# Patient Record
Sex: FEMALE | Race: WHITE | URBAN - METROPOLITAN AREA
[De-identification: names, ages, dates, MRNs, and addresses within clinical notes are randomized per-mention and may not be internally consistent; named-entity substitution may affect disease eponyms.]

---

## 2022-09-21 ENCOUNTER — INITIAL CONSULT (OUTPATIENT)
Dept: ONCOLOGY | Age: 27
End: 2022-09-21
Payer: COMMERCIAL

## 2022-09-21 ENCOUNTER — HOSPITAL ENCOUNTER (OUTPATIENT)
Dept: INFUSION THERAPY | Age: 27
Discharge: HOME | End: 2022-09-21
Payer: COMMERCIAL

## 2022-09-21 VITALS
BODY MASS INDEX: 31.07 KG/M2 | SYSTOLIC BLOOD PRESSURE: 152 MMHG | DIASTOLIC BLOOD PRESSURE: 93 MMHG | HEIGHT: 68 IN | OXYGEN SATURATION: 99 % | TEMPERATURE: 97.9 F | HEART RATE: 88 BPM | WEIGHT: 205 LBS

## 2022-09-21 DIAGNOSIS — D72.828 OTHER ELEVATED WHITE BLOOD CELL (WBC) COUNT: ICD-10-CM

## 2022-09-21 DIAGNOSIS — R59.0 LYMPHADENOPATHY OF HEAD AND NECK REGION: ICD-10-CM

## 2022-09-21 PROBLEM — D72.829 LEUKOCYTOSIS (LEUCOCYTOSIS): Status: ACTIVE | Noted: 2022-09-21

## 2022-09-21 PROCEDURE — 99211 OFF/OP EST MAY X REQ PHY/QHP: CPT

## 2022-09-21 PROCEDURE — 99204 OFFICE O/P NEW MOD 45 MIN: CPT | Performed by: INTERNAL MEDICINE

## 2022-09-21 RX ORDER — BUPROPION HYDROCHLORIDE 150 MG/1
TABLET ORAL
COMMUNITY
Start: 2022-07-19

## 2022-09-21 RX ORDER — ALBUTEROL SULFATE 90 UG/1
2 AEROSOL, METERED RESPIRATORY (INHALATION)
COMMUNITY
Start: 2022-03-07

## 2022-09-21 RX ORDER — LEVOTHYROXINE SODIUM 88 UG/1
TABLET ORAL
COMMUNITY
Start: 2022-07-15

## 2022-09-21 RX ORDER — PHENTERMINE HYDROCHLORIDE 37.5 MG/1
TABLET ORAL
COMMUNITY
Start: 2022-07-21

## 2022-09-21 ASSESSMENT — PATIENT HEALTH QUESTIONNAIRE - PHQ9
SUM OF ALL RESPONSES TO PHQ QUESTIONS 1-9: 0
SUM OF ALL RESPONSES TO PHQ9 QUESTIONS 1 & 2: 0
1. LITTLE INTEREST OR PLEASURE IN DOING THINGS: 0
SUM OF ALL RESPONSES TO PHQ QUESTIONS 1-9: 0
2. FEELING DOWN, DEPRESSED OR HOPELESS: 0
SUM OF ALL RESPONSES TO PHQ QUESTIONS 1-9: 0
SUM OF ALL RESPONSES TO PHQ QUESTIONS 1-9: 0

## 2022-09-21 NOTE — PROGRESS NOTES
MA Rooming Questions  Patient: Guanako Obrien  MRN: [id number]    Date: 9/21/2022      NEW PATIENT     5. Did the patient have a depression screening completed today?  Yes    PHQ-9 Total Score: 0 (9/21/2022  3:38 PM)       PHQ-9 Given to (if applicable):               PHQ-9 Score (if applicable):                     [] Positive     []  Negative              Does question #9 need addressed (if applicable)                     [] Yes    []  No               Leodan Castaneda CMA

## 2022-09-21 NOTE — PROGRESS NOTES
Patient Name:  Devin Obrien  Patient :  1995  Patient MRN:  [id number]     Primary Oncologist: Johnathan Pandey MD  Referring Provider: RONNI Hess CNP     Date of Service: 2022      Reason for Consult: To evaluate the patient with lymphadenopathy. Chief Complaint:    Chief Complaint   Patient presents with    New Patient     Patient's active problem list:      Lymphadenopathy    HPI:   Devin Obrien is a 68-year-old very pleasant female with medical history significant for hyperlipidemia, bronchial asthma, attention deficit disorder, depression/anxiety, hypothyroidism and irritable bowel syndrome, referred to me on 2022 for evaluation of cervical lymphadenopathy. She stated that she has been having recurrent illnesses that began in 2021 (\"bad lung infection\"). Then 6 weeks later had Influenza A. In , she went to [place] and upon return had a fungal infection on her leg, itching all over. Given tx for fungal infection and this resolved. Then 1 month later had poison ivy, then began itching all over again. Given steroid injection and steroid taper, then would have lymph nodes swell up and then decrease in size. Right leg, then right cervical and now the left side X 3 months. Subsequently had US neck/thyroid on 22 and it showed no thyroid nodule identified. Thyroid gland was hypervascular. A few scattered nonenlarged lymph nodes without aggressive features account for the palpable abnormalities. She was also noted to have mild leukocytosis (WBC 11.8) on 22. Her WBC count was normal on 22. No anemia or thrombocytopenia. She was subsequently referred to me for evaluation of her swollen lymph nodes and leukocytosis. She has back pain off and on. Besides that, she doesn't have any significant symptoms on today visit. Past Medical History:     Significant for  1. Hyperlipidemia  2. Bronchial asthma  3. Attention deficit disorder  4. Depression/anxiety  5. Hypothyroidism  6. Irritable bowel syndrome with constipation  7. Seasonal allergies    Past Surgery History:    Significant for baseline   1. Appendicectomy  2. [place] teeth extraction    Social History:   She denies smoking or illicit drug abuse. She socially drinks alcohol. Family History:    Significant for esophageal cancer in her maternal grandfather and the maternal great uncle. Her maternal great grandfather had stomach cancer and maternal great grandmother has kidney and colon cancer. Her maternal grandmother has uterine cancer and maternal great aunt has breast cancer. Allergies   Allergen Reactions    Amoxicillin Rash     Other reaction(s): Rash  Other reaction(s): Rash      Clavulanic Acid Rash     Other reaction(s): Rash  Other reaction(s): Rash      Penicillins Rash       Current Outpatient Medications on File Prior to Visit   Medication Sig Dispense Refill    albuterol sulfate HFA (PROVENTIL;VENTOLIN;PROAIR) 108 (90 Base) MCG/ACT inhaler Inhale 2 puffs into the lungs      buPROPion (WELLBUTRIN XL) 150 MG extended release tablet TAKE 1 TABLET BY MOUTH EVERY DAY      levothyroxine (SYNTHROID) 88 MCG tablet TAKE 1 TABLET BY MOUTH EVERY DAY      phentermine (ADIPEX-P) 37.5 MG tablet TAKE 1 TABLET (37.5 MG TOTAL) BY MOUTH EVERY MORNING (BEFORE BREAKFAST). No current facility-administered medications on file prior to visit. Review of Systems:  Constitutional:  No weight loss, No fever, No chills, No night sweats. Energy level good. Eyes:  No diplopia, No transient or permanent loss of vision, No scotomata. ENT / Mouth:  No epistaxis, No dysphagia, No hoarseness, No oral ulcers, No gingival bleeding. No sore throat, No postnasal drip, No nasal drip, No mouth pain, No sinus pain, No tinnitus, Normal hearing. Cardiovascular:  No chest pain, No palpitations, No syncope, No upper extremity edema, No lower extremity edema, No calf discomfort.   Respiratory: No cough. No hemoptysis, No pleurisy, No wheezing, No dyspnea. Breast:  No breast mass, No pain, No nipple discharge, No change in size, No change in shape. Gastrointestinal:  No abdominal pain, No abdominal cramping, No nausea, No vomiting, No constipation, No diarrhea, No hematochezia, No melena, No jaundice, No dyspepsia, No dysphagia. Urinary:  No dysuria, No hematuria, No urinary incontinence. Gynecological:  No vaginal discharge, No suprapubic pain, No abnormal vaginal bleeding. Musculoskeletal:  No muscle pain, No swollen joints, No joint redness, No bone pain, No spine tenderness, has lower back pain. Skin:  No rash, No nodules, No pruritus, No lesions. Neurologic:  No confusion, No seizures, No syncope, No tremor, No speech change, No headache, No hiccups, No abnormal gait, No sensory changes, No weakness. Psychiatric:  No depression, No anxiety, Concentration normal.  Endocrine:  No polyuria, No polydipsia, No hot flashes, No thyroid symptoms. Hematologic:  No epistaxis, No gingival bleeding, No petechiae, No ecchymosis. Lymphatic:  No lymphadenopathy, No lymphedema. Allergy / Immunologic:  No eczema, No frequent mucous infections, No frequent respiratory infections, No recurrent urticarial, No frequent skin infections.      Vital Signs: BP (!) 152/93 (Site: Right Upper Arm, Position: Sitting, Cuff Size: Large Adult)   Pulse 88   Temp 97.9 °F (36.6 °C) (Temporal)   Ht 5' 7.75\" (1.721 m)   Wt 205 lb (93 kg)   SpO2 99%   BMI 31.40 kg/m²      Physical Exam:  CONSTITUTIONAL: awake, alert, cooperative, no apparent distress   EYES: pupils equal, round and reactive to light, sclera clear, normal conjunctiva  ENT: Normocephalic, without obvious abnormality, atraumatic  NECK: supple, symmetrical, no jugular venous distension, no carotid bruits   HEMATOLOGIC/LYMPHATIC: no cervical, supraclavicular or axillary lymphadenopathy   LUNGS: VBS, no wheezes, no increased work of breathing, no rhonchi, clear to auscultation, no crackles,    CARDIOVASCULAR: regular rate and rhythm, normal S1 and S2, no murmur noted  ABDOMEN: normal bowel sounds x 4, soft, non-distended, non-tender, no masses palpated, no hepatosplenomegaly   MUSCULOSKELETAL: full range of motion noted, tone is normal  NEUROLOGIC: awake, alert, oriented to name, place and time. Motor skills grossly intact. SKIN: appears intact, normal skin color, normal texture, normal turgor, no jaundice. EXTREMITIES: no LE edema, no leg swelling, no cyanosis, no clubbing,       Labs:  Hematology:  No results found for: WBC, RBC, HGB, HCT, MCV, MCH, MCHC, RDW, PLT, MPV, BANDSPCT, SEGSPCT, EOSRELPCT, BASOPCT, LYMPHOPCT, MONOPCT, BANDABS, SEGSABS, EOSABS, BASOSABS, LYMPHSABS, MONOSABS, DIFFTYPE, ANISOCYTOSIS, POLYCHROM, WBCMORP, PLTM  No results found for: ESR  Chemistry:  No results found for: NA, K, CL, CO2, BUN, CREATININE, GLUCOSE, CALCIUM, PROT, LABALBU, BILITOT, ALKPHOS, AST, ALT, LABGLOM, GFRAA, AGRATIO, GLOB, PHOS, MG, POCCA, POCGLU  No results found for: MMA, LDH, HOMOCYSTEINE  No components found for: LD  No results found for: TSHHS, T4FREE, FT3  Immunology:  No results found for: PROT, SPEP, ALBUMINELP, LABALPH, LABBETA, GAMGLOB  No results found for: Alejandra Flies, LAMBDAUVOL, KLFLCR  No results found for: B2M  Coagulation Panel:  No results found for: PROTIME, INR, APTT, DDIMER  Anemia Panel:  No results found for: QGXSKFDO01, FOLATE  Tumor Markers:  No results found for: , CEA, , LABCA2, PSA     Observations:  PHQ-9 Total Score: 0 (9/21/2022  3:38 PM)       Assessment   Cervical lymphadenopathy  Leukocytosis    Plan:  I reviewed her US thyroid/neck with her and her mother. There is no palpable lymph node detected on today physical exam. Since there is no palpable node on today exam, I believe her previously palpable cervical lymph adenopathy are most likely due to reactive process.      She has mild leukocytosis on 9/16/22 blood test and normal WBC count on 7/12/22. Her hemoglobin and platelet counts were normal. I also believe her mild leukocytosis is due to reactive process. I do not recommend additional work ups at this moment. I will re evaluate her again in 3 months and will repeat physical exam and labs at that time. I asked her to contact me if she has recurrent persistent lymphadenopathy in future. If that is the case, I will consider to have CT scans. I answered all her questions and concerns for today. I asked her to follow up with primary care physician on regular basis. I will continue to keep you updated on her progress. Thank you for allowing me to participate in the care of this very pleasant patient. Recent imaging and labs were reviewed and discussed with the patient.

## 2022-09-26 ENCOUNTER — OFFICE VISIT (OUTPATIENT)
Dept: OBGYN | Age: 27
End: 2022-09-26
Payer: COMMERCIAL

## 2022-09-26 ENCOUNTER — HOSPITAL ENCOUNTER (OUTPATIENT)
Age: 27
Setting detail: SPECIMEN
Discharge: HOME | End: 2022-09-26
Payer: COMMERCIAL

## 2022-09-26 VITALS
WEIGHT: 206 LBS | SYSTOLIC BLOOD PRESSURE: 144 MMHG | HEIGHT: 67 IN | BODY MASS INDEX: 32.33 KG/M2 | DIASTOLIC BLOOD PRESSURE: 93 MMHG

## 2022-09-26 DIAGNOSIS — N93.9 ABNORMAL UTERINE BLEEDING (AUB): ICD-10-CM

## 2022-09-26 DIAGNOSIS — R10.2 PELVIC PAIN: ICD-10-CM

## 2022-09-26 DIAGNOSIS — Z30.432 ENCOUNTER FOR IUD REMOVAL: ICD-10-CM

## 2022-09-26 DIAGNOSIS — Z01.419 ENCOUNTER FOR ANNUAL ROUTINE GYNECOLOGICAL EXAMINATION: Primary | ICD-10-CM

## 2022-09-26 PROCEDURE — 88142 CYTOPATH C/V THIN LAYER: CPT

## 2022-09-26 PROCEDURE — 58301 REMOVE INTRAUTERINE DEVICE: CPT | Performed by: OBSTETRICS & GYNECOLOGY

## 2022-09-26 PROCEDURE — 99395 PREV VISIT EST AGE 18-39: CPT | Performed by: OBSTETRICS & GYNECOLOGY

## 2022-09-26 RX ORDER — ACETAMINOPHEN AND CODEINE PHOSPHATE 120; 12 MG/5ML; MG/5ML
1 SOLUTION ORAL DAILY
Qty: 84 TABLET | Refills: 3 | Status: SHIPPED | OUTPATIENT
Start: 2022-09-26

## 2022-09-26 SDOH — ECONOMIC STABILITY: FOOD INSECURITY: WITHIN THE PAST 12 MONTHS, YOU WORRIED THAT YOUR FOOD WOULD RUN OUT BEFORE YOU GOT MONEY TO BUY MORE.: NEVER TRUE

## 2022-09-26 SDOH — ECONOMIC STABILITY: FOOD INSECURITY: WITHIN THE PAST 12 MONTHS, THE FOOD YOU BOUGHT JUST DIDN'T LAST AND YOU DIDN'T HAVE MONEY TO GET MORE.: NEVER TRUE

## 2022-09-26 SDOH — ECONOMIC STABILITY: HOUSING INSECURITY
IN THE LAST 12 MONTHS, WAS THERE A TIME WHEN YOU DID NOT HAVE A STEADY PLACE TO SLEEP OR SLEPT IN A SHELTER (INCLUDING NOW)?: NO

## 2022-09-26 SDOH — ECONOMIC STABILITY: INCOME INSECURITY: IN THE LAST 12 MONTHS, WAS THERE A TIME WHEN YOU WERE NOT ABLE TO PAY THE MORTGAGE OR RENT ON TIME?: NO

## 2022-09-26 SDOH — ECONOMIC STABILITY: TRANSPORTATION INSECURITY
IN THE PAST 12 MONTHS, HAS LACK OF TRANSPORTATION KEPT YOU FROM MEETINGS, WORK, OR FROM GETTING THINGS NEEDED FOR DAILY LIVING?: NO

## 2022-09-26 SDOH — ECONOMIC STABILITY: TRANSPORTATION INSECURITY
IN THE PAST 12 MONTHS, HAS THE LACK OF TRANSPORTATION KEPT YOU FROM MEDICAL APPOINTMENTS OR FROM GETTING MEDICATIONS?: NO

## 2022-09-26 ASSESSMENT — ENCOUNTER SYMPTOMS
GASTROINTESTINAL NEGATIVE: 1
RESPIRATORY NEGATIVE: 1
EYES NEGATIVE: 1
ALLERGIC/IMMUNOLOGIC NEGATIVE: 1

## 2022-09-26 ASSESSMENT — SOCIAL DETERMINANTS OF HEALTH (SDOH): HOW HARD IS IT FOR YOU TO PAY FOR THE VERY BASICS LIKE FOOD, HOUSING, MEDICAL CARE, AND HEATING?: NOT VERY HARD

## 2022-09-26 NOTE — PROGRESS NOTES
9/26/22    Samina Obrien  1995    Chief Complaint   Patient presents with    Annual Exam     Pt here for annual exam. LMP 9/23/22. Last pap 2020 @ [facility] per pt. Pt has Mira [place]. Inserted 2020. Pt c/o severe pain and pressure, with no relief. Pt would like to switch to ocp w/o estrogen. Pt is currently sexually active. The patient is a 32 y.o. female, No obstetric history on file. who presents for her annual exam.  She is menstruating abnormally. She is  sexually active. She is currently taking birth control. Birth control method is Mira [place] IUD    She reports additional symptoms of abnormal bleeding and pelvic pain. Pap smear history: Her last PAP smear was in 2020.   Her results were normal.    Past Medical History:   Diagnosis Date    Anxiety     Asthma     Depression     Hyperlipidemia     IBS (irritable bowel syndrome)     Thyroid disease        Past Surgical History:   Procedure Laterality Date    APPENDECTOMY         Family History   Problem Relation Age of Onset    Mental Illness Mother     High Blood Pressure Mother     Diabetes Mother     Depression Mother     Coronary Art Dis Mother     Migraines Mother     Migraines Father     Mental Illness Father     High Cholesterol Father     High Blood Pressure Father     Depression Father     Coronary Art Dis Father     Asthma Father     Alcohol Abuse Father     Irritable Bowel Syndrome Father     Alcohol Abuse Maternal Grandmother     Asthma Maternal Grandfather        Social History     Tobacco Use    Smoking status: Never    Smokeless tobacco: Never   Substance Use Topics    Alcohol use: Yes    Drug use: Yes     Types: Marijuana [place])       Current Outpatient Medications   Medication Sig Dispense Refill    norethindrone (LAURIE) 0.35 MG tablet Take 1 tablet by mouth daily 84 tablet 3    albuterol sulfate HFA (PROVENTIL;VENTOLIN;PROAIR) 108 (90 Base) MCG/ACT inhaler Inhale 2 puffs into the lungs      buPROPion (WELLBUTRIN XL) 150 MG extended release tablet TAKE 1 TABLET BY MOUTH EVERY DAY      levothyroxine (SYNTHROID) 88 MCG tablet TAKE 1 TABLET BY MOUTH EVERY DAY      phentermine (ADIPEX-P) 37.5 MG tablet TAKE 1 TABLET (37.5 MG TOTAL) BY MOUTH EVERY MORNING (BEFORE BREAKFAST). No current facility-administered medications for this visit. Allergies   Allergen Reactions    Amoxicillin Rash     Other reaction(s): Rash  Other reaction(s): Rash      Clavulanic Acid Rash     Other reaction(s): Rash  Other reaction(s): Rash      Penicillins Rash       No obstetric history on file. Immunization History   Administered Date(s) Administered    COVID-19, J&J, (age 18y+), IM, 0.5 mL 06/10/2021    COVID-19, MODERNA BLUE border, Primary or Immunocompromised, (age 12y+), IM, 100 mcg/0.5mL 01/20/2022       Review of Systems   Constitutional: Negative. Eyes: Negative. Respiratory: Negative. Cardiovascular: Negative. Gastrointestinal: Negative. Endocrine: Negative. Genitourinary:  Positive for menstrual problem and pelvic pain. Musculoskeletal: Negative. Skin: Negative. Allergic/Immunologic: Negative. Neurological: Negative. Hematological: Negative. Psychiatric/Behavioral: Negative. BP (!) 144/93   Ht 5' 7\" (1.702 m)   Wt 206 lb (93.4 kg)   LMP 09/23/2022   BMI 32.26 kg/m²     Physical Exam  Exam conducted with a chaperone present. Constitutional:       Appearance: Normal appearance. HENT:      Head: Normocephalic and atraumatic. Nose: Nose normal.   Eyes:      Conjunctiva/sclera: Conjunctivae normal.   Cardiovascular:      Rate and Rhythm: Normal rate. Pulses: Normal pulses. Pulmonary:      Effort: Pulmonary effort is normal.   Chest:   Breasts:     Right: Normal.      Left: Normal.   Abdominal:      General: Abdomen is flat. Bowel sounds are normal.      Palpations: Abdomen is soft. Hernia: There is no hernia in the left inguinal area or right inguinal area.    Genitourinary:     General: Normal vulva. Exam position: Lithotomy position. Labia:         Right: No rash, tenderness or lesion. Left: No rash, tenderness or lesion. Urethra: No prolapse. Vagina: Normal. No foreign body. No vaginal discharge, erythema, tenderness, bleeding, lesions or prolapsed vaginal walls. Cervix: No cervical motion tenderness, discharge, friability, lesion, erythema or cervical bleeding. Uterus: Normal. Not enlarged, not tender and no uterine prolapse. Adnexa: Right adnexa normal and left adnexa normal.        Right: No mass, tenderness or fullness. Left: No mass, tenderness or fullness. Musculoskeletal:      Cervical back: Normal range of motion and neck supple. Lymphadenopathy:      Upper Body:      Right upper body: No supraclavicular, axillary or pectoral adenopathy. Left upper body: No supraclavicular, axillary or pectoral adenopathy. Skin:     General: Skin is warm. Coloration: Skin is not ashen or cyanotic. Findings: No abrasion, abscess or bruising. Rash is not crusting or macular. Neurological:      Mental Status: She is alert and oriented to person, place, and time. No results found for this visit on 09/26/22. Assessment and Plan   Diagnosis Orders   1. Encounter for annual routine gynecological examination  norethindrone (LAURIE) 0.35 MG tablet      2. Abnormal uterine bleeding (AUB)  norethindrone (LAURIE) 0.35 MG tablet      3. Pelvic pain  norethindrone (LAURIE) 0.35 MG tablet    PAP SMEAR          Return in about 1 year (around 9/26/2023). Yana Morales MD         PRE-OP DIAGNOSIS: pelvic pain, aub  POST-OP DIAGNOSIS: Same   PROCEDURE: IUD Removal   Performing Physician: Yana Morales MD  Risks: Risks were reviewed with patient in detail which include breakage of device and/or strings (which then may need to be removed by a specialist under anesthesia), bleeding, and pain.     PROCEDURE:   The speculum was placed and the IUD strings visualized. Using ringed forceps, the IUD string was grasped and the device was removed without difficulty. Bleeding was minimal.     Followup: The patient tolerated the procedure well without complications. Standard post-procedure care is explained and return precautions are given. Patient desires to use ocps for birth control.

## 2023-09-21 DIAGNOSIS — Z01.419 ENCOUNTER FOR ANNUAL ROUTINE GYNECOLOGICAL EXAMINATION: ICD-10-CM

## 2023-09-21 DIAGNOSIS — R10.2 PELVIC PAIN: ICD-10-CM

## 2023-09-21 DIAGNOSIS — N93.9 ABNORMAL UTERINE BLEEDING (AUB): ICD-10-CM

## 2023-09-21 RX ORDER — ACETAMINOPHEN AND CODEINE PHOSPHATE 120; 12 MG/5ML; MG/5ML
1 SOLUTION ORAL DAILY
Qty: 84 TABLET | Refills: 3 | OUTPATIENT
Start: 2023-09-21

## 2023-10-03 DIAGNOSIS — N93.9 ABNORMAL UTERINE BLEEDING (AUB): ICD-10-CM

## 2023-10-03 DIAGNOSIS — Z01.419 ENCOUNTER FOR ANNUAL ROUTINE GYNECOLOGICAL EXAMINATION: ICD-10-CM

## 2023-10-03 DIAGNOSIS — R10.2 PELVIC PAIN: ICD-10-CM

## 2023-10-03 RX ORDER — ACETAMINOPHEN AND CODEINE PHOSPHATE 120; 12 MG/5ML; MG/5ML
1 SOLUTION ORAL DAILY
Qty: 84 TABLET | Refills: 0 | Status: SHIPPED | OUTPATIENT
Start: 2023-10-03

## 2023-12-22 DIAGNOSIS — R10.2 PELVIC PAIN: ICD-10-CM

## 2023-12-22 DIAGNOSIS — N93.9 ABNORMAL UTERINE BLEEDING (AUB): ICD-10-CM

## 2023-12-22 DIAGNOSIS — Z01.419 ENCOUNTER FOR ANNUAL ROUTINE GYNECOLOGICAL EXAMINATION: ICD-10-CM

## 2023-12-22 RX ORDER — NORETHINDRONE 0.35 MG/1
1 TABLET ORAL DAILY
Qty: 84 TABLET | Refills: 0 | OUTPATIENT
Start: 2023-12-22

## 2023-12-27 DIAGNOSIS — Z01.419 ENCOUNTER FOR ANNUAL ROUTINE GYNECOLOGICAL EXAMINATION: ICD-10-CM

## 2023-12-27 DIAGNOSIS — R10.2 PELVIC PAIN: ICD-10-CM

## 2023-12-27 DIAGNOSIS — N93.9 ABNORMAL UTERINE BLEEDING (AUB): ICD-10-CM

## 2023-12-28 DIAGNOSIS — R10.2 PELVIC PAIN: ICD-10-CM

## 2023-12-28 DIAGNOSIS — N93.9 ABNORMAL UTERINE BLEEDING (AUB): ICD-10-CM

## 2023-12-28 DIAGNOSIS — Z01.419 ENCOUNTER FOR ANNUAL ROUTINE GYNECOLOGICAL EXAMINATION: ICD-10-CM

## 2023-12-28 RX ORDER — NORETHINDRONE 0.35 MG/1
1 TABLET ORAL DAILY
Qty: 84 TABLET | Refills: 0 | OUTPATIENT
Start: 2023-12-28

## 2023-12-29 RX ORDER — ACETAMINOPHEN AND CODEINE PHOSPHATE 120; 12 MG/5ML; MG/5ML
1 SOLUTION ORAL DAILY
Qty: 84 TABLET | Refills: 1 | Status: SHIPPED | OUTPATIENT
Start: 2023-12-29

## 2024-01-23 ENCOUNTER — OFFICE VISIT (OUTPATIENT)
Dept: OBGYN | Age: 29
End: 2024-01-23
Payer: COMMERCIAL

## 2024-01-23 VITALS
WEIGHT: 225 LBS | SYSTOLIC BLOOD PRESSURE: 125 MMHG | DIASTOLIC BLOOD PRESSURE: 76 MMHG | BODY MASS INDEX: 35.24 KG/M2 | HEART RATE: 71 BPM

## 2024-01-23 DIAGNOSIS — R10.2 PELVIC PAIN: ICD-10-CM

## 2024-01-23 DIAGNOSIS — N93.9 ABNORMAL UTERINE BLEEDING (AUB): ICD-10-CM

## 2024-01-23 DIAGNOSIS — E66.9 OBESITY, UNSPECIFIED CLASSIFICATION, UNSPECIFIED OBESITY TYPE, UNSPECIFIED WHETHER SERIOUS COMORBIDITY PRESENT: ICD-10-CM

## 2024-01-23 DIAGNOSIS — Z01.419 ENCOUNTER FOR ANNUAL ROUTINE GYNECOLOGICAL EXAMINATION: ICD-10-CM

## 2024-01-23 DIAGNOSIS — N92.6 IRREGULAR MENSES: ICD-10-CM

## 2024-01-23 DIAGNOSIS — Z01.419 WOMEN'S ANNUAL ROUTINE GYNECOLOGICAL EXAMINATION: Primary | ICD-10-CM

## 2024-01-23 PROCEDURE — 99395 PREV VISIT EST AGE 18-39: CPT | Performed by: NURSE PRACTITIONER

## 2024-01-23 PROCEDURE — G8484 FLU IMMUNIZE NO ADMIN: HCPCS | Performed by: NURSE PRACTITIONER

## 2024-01-23 RX ORDER — ACETAMINOPHEN AND CODEINE PHOSPHATE 120; 12 MG/5ML; MG/5ML
1 SOLUTION ORAL DAILY
Qty: 84 TABLET | Refills: 3 | Status: SHIPPED | OUTPATIENT
Start: 2024-01-23

## 2024-01-23 SDOH — ECONOMIC STABILITY: FOOD INSECURITY: WITHIN THE PAST 12 MONTHS, YOU WORRIED THAT YOUR FOOD WOULD RUN OUT BEFORE YOU GOT MONEY TO BUY MORE.: NEVER TRUE

## 2024-01-23 SDOH — ECONOMIC STABILITY: FOOD INSECURITY: WITHIN THE PAST 12 MONTHS, THE FOOD YOU BOUGHT JUST DIDN'T LAST AND YOU DIDN'T HAVE MONEY TO GET MORE.: NEVER TRUE

## 2024-01-23 SDOH — ECONOMIC STABILITY: INCOME INSECURITY: HOW HARD IS IT FOR YOU TO PAY FOR THE VERY BASICS LIKE FOOD, HOUSING, MEDICAL CARE, AND HEATING?: NOT VERY HARD

## 2024-01-23 ASSESSMENT — PATIENT HEALTH QUESTIONNAIRE - PHQ9
SUM OF ALL RESPONSES TO PHQ9 QUESTIONS 1 & 2: 0
SUM OF ALL RESPONSES TO PHQ QUESTIONS 1-9: 0
SUM OF ALL RESPONSES TO PHQ QUESTIONS 1-9: 0
2. FEELING DOWN, DEPRESSED OR HOPELESS: 0
SUM OF ALL RESPONSES TO PHQ QUESTIONS 1-9: 0
SUM OF ALL RESPONSES TO PHQ QUESTIONS 1-9: 0
1. LITTLE INTEREST OR PLEASURE IN DOING THINGS: 0

## 2024-01-23 ASSESSMENT — ENCOUNTER SYMPTOMS
GASTROINTESTINAL NEGATIVE: 1
DIARRHEA: 0
CONSTIPATION: 0
SHORTNESS OF BREATH: 0
VOMITING: 0
ABDOMINAL PAIN: 0
NAUSEA: 0
RESPIRATORY NEGATIVE: 1

## 2024-01-23 NOTE — PROGRESS NOTES
TAKE 1 TABLET BY MOUTH EVERY DAY      buPROPion (WELLBUTRIN XL) 150 MG extended release tablet TAKE 1 TABLET BY MOUTH EVERY DAY (Patient not taking: Reported on 2024)      phentermine (ADIPEX-P) 37.5 MG tablet TAKE 1 TABLET (37.5 MG TOTAL) BY MOUTH EVERY MORNING (BEFORE BREAKFAST). (Patient not taking: Reported on 2024)       No current facility-administered medications for this visit.       Allergies   Allergen Reactions    Amoxicillin Rash     Other reaction(s): Rash  Other reaction(s): Rash      Clavulanic Acid Rash     Other reaction(s): Rash  Other reaction(s): Rash      Penicillins Rash           Immunization History   Administered Date(s) Administered    COVID-19, J&J, (age 18y+), IM, 0.5 mL 06/10/2021    COVID-19, MODERNA BLUE border, Primary or Immunocompromised, (age 12y+), IM, 100 mcg/0.5mL 2022       Review of Systems   Constitutional: Negative.  Negative for fatigue.   Respiratory: Negative.  Negative for shortness of breath.    Gastrointestinal: Negative.  Negative for abdominal pain, constipation, diarrhea, nausea and vomiting.   Genitourinary: Negative.    Neurological:  Negative for dizziness.   Psychiatric/Behavioral: Negative.         /76 (Site: Right Upper Arm, Position: Sitting, Cuff Size: Large Adult)   Pulse 71   Wt 102.1 kg (225 lb)   LMP 2023 (Exact Date)   BMI 35.24 kg/m²     Physical Exam  Vitals and nursing note reviewed.   Constitutional:       Appearance: Normal appearance. She is obese.   HENT:      Head: Normocephalic.   Pulmonary:      Effort: Pulmonary effort is normal. No respiratory distress.   Chest:   Breasts:     Right: Normal.      Left: Normal.   Abdominal:      Palpations: Abdomen is soft.      Tenderness: There is no abdominal tenderness.   Genitourinary:     General: Normal vulva.      Exam position: Lithotomy position.      Vagina: Normal.      Cervix: Normal.      Uterus: Normal.       Adnexa: Right adnexa normal and left adnexa

## 2025-01-30 DIAGNOSIS — N92.6 IRREGULAR MENSES: ICD-10-CM

## 2025-01-30 RX ORDER — ACETAMINOPHEN AND CODEINE PHOSPHATE 120; 12 MG/5ML; MG/5ML
1 SOLUTION ORAL DAILY
Qty: 28 TABLET | Refills: 0 | Status: SHIPPED | OUTPATIENT
Start: 2025-01-30

## 2025-02-19 ENCOUNTER — HOSPITAL ENCOUNTER (OUTPATIENT)
Age: 30
Setting detail: SPECIMEN
Discharge: HOME | End: 2025-02-19
Payer: COMMERCIAL

## 2025-02-19 ENCOUNTER — OFFICE VISIT (OUTPATIENT)
Dept: OBGYN | Age: 30
End: 2025-02-19
Payer: COMMERCIAL

## 2025-02-19 VITALS
HEIGHT: 68 IN | WEIGHT: 242 LBS | DIASTOLIC BLOOD PRESSURE: 86 MMHG | HEART RATE: 67 BPM | SYSTOLIC BLOOD PRESSURE: 133 MMHG | BODY MASS INDEX: 36.68 KG/M2

## 2025-02-19 DIAGNOSIS — E03.9 HYPOTHYROIDISM, UNSPECIFIED TYPE: ICD-10-CM

## 2025-02-19 DIAGNOSIS — L70.9 ACNE, UNSPECIFIED ACNE TYPE: ICD-10-CM

## 2025-02-19 DIAGNOSIS — L68.0 HIRSUTISM: ICD-10-CM

## 2025-02-19 DIAGNOSIS — Z80.9 FAMILY HISTORY OF CANCER: ICD-10-CM

## 2025-02-19 DIAGNOSIS — Z13.79 GENETIC TESTING: ICD-10-CM

## 2025-02-19 DIAGNOSIS — N92.6 IRREGULAR MENSES: ICD-10-CM

## 2025-02-19 DIAGNOSIS — E66.9 OBESITY, UNSPECIFIED CLASS, UNSPECIFIED OBESITY TYPE, UNSPECIFIED WHETHER SERIOUS COMORBIDITY PRESENT: ICD-10-CM

## 2025-02-19 DIAGNOSIS — Z01.419 WOMEN'S ANNUAL ROUTINE GYNECOLOGICAL EXAMINATION: Primary | ICD-10-CM

## 2025-02-19 PROCEDURE — 99395 PREV VISIT EST AGE 18-39: CPT | Performed by: NURSE PRACTITIONER

## 2025-02-19 PROCEDURE — 99213 OFFICE O/P EST LOW 20 MIN: CPT | Performed by: NURSE PRACTITIONER

## 2025-02-19 PROCEDURE — G0123 SCREEN CERV/VAG THIN LAYER: HCPCS

## 2025-02-19 PROCEDURE — 87624 HPV HI-RISK TYP POOLED RSLT: CPT

## 2025-02-19 PROCEDURE — 36415 COLL VENOUS BLD VENIPUNCTURE: CPT | Performed by: NURSE PRACTITIONER

## 2025-02-19 SDOH — ECONOMIC STABILITY: FOOD INSECURITY: WITHIN THE PAST 12 MONTHS, YOU WORRIED THAT YOUR FOOD WOULD RUN OUT BEFORE YOU GOT MONEY TO BUY MORE.: NEVER TRUE

## 2025-02-19 SDOH — ECONOMIC STABILITY: FOOD INSECURITY: WITHIN THE PAST 12 MONTHS, THE FOOD YOU BOUGHT JUST DIDN'T LAST AND YOU DIDN'T HAVE MONEY TO GET MORE.: NEVER TRUE

## 2025-02-19 ASSESSMENT — PATIENT HEALTH QUESTIONNAIRE - PHQ9
2. FEELING DOWN, DEPRESSED OR HOPELESS: MORE THAN HALF THE DAYS
SUM OF ALL RESPONSES TO PHQ QUESTIONS 1-9: 2
1. LITTLE INTEREST OR PLEASURE IN DOING THINGS: NOT AT ALL
SUM OF ALL RESPONSES TO PHQ QUESTIONS 1-9: 2
SUM OF ALL RESPONSES TO PHQ9 QUESTIONS 1 & 2: 2

## 2025-02-19 ASSESSMENT — ENCOUNTER SYMPTOMS
NAUSEA: 0
DIARRHEA: 0
GASTROINTESTINAL NEGATIVE: 1
RESPIRATORY NEGATIVE: 1
ABDOMINAL PAIN: 0
CONSTIPATION: 0
VOMITING: 0
SHORTNESS OF BREATH: 0

## 2025-02-19 NOTE — PROGRESS NOTES
25    Ivana Obrien  1995    Chief Complaint   Patient presents with    Annual Exam     Annual exam. Non-smoker No known h/o dvt.  Patient's last menstrual period was 2025 (approximate)..Periods are irregular.Patient is sexually active. Patient is on oral contraception. Pap Smear was   and results were negativeHPV not ordered. . Patient complains of irregular menses. Has questions regarding pcos. Pt requesting refill  on ocp.        The patient is a 29 y.o. female,  who presents for her annual exam.  She is menstruating abnormally.  She is  sexually active. She is currently taking birth control.  Birth control method is oral contraceptive    She reports no gynecological symptoms.      Concerned she may have PCOS due to irregular menses and hx of ovarian cyst at age 24 (had no f/u)    Pap smear history: Her last PAP smear was in .  Her results were normal.    Past Medical History:   Diagnosis Date    Anxiety     Asthma     Depression     Hashimoto thyroiditis     Hyperlipidemia     IBS (irritable bowel syndrome)     Irregular menses     Missed      Obesity     Thyroid disease        Past Surgical History:   Procedure Laterality Date    APPENDECTOMY         Family History   Problem Relation Age of Onset    Alcohol Abuse Maternal Grandmother     Asthma Maternal Grandfather     Migraines Father     Mental Illness Father     High Cholesterol Father     High Blood Pressure Father     Depression Father     Coronary Art Dis Father     Asthma Father     Alcohol Abuse Father     Irritable Bowel Syndrome Father     Mental Illness Mother     High Blood Pressure Mother     Diabetes Mother     Depression Mother     Coronary Art Dis Mother     Migraines Mother     Uterine Cancer Maternal Aunt     Uterine Cancer Maternal Grandparent        Social History     Tobacco Use    Smoking status: Never    Smokeless tobacco: Never   Vaping Use    Vaping status: Never Used   Substance Use Topics

## 2025-02-20 LAB
EST. AVERAGE GLUCOSE BLD GHB EST-MCNC: 91.1 MG/DL
FSH SERPL-ACNC: 4.7 MIU/ML
HBA1C MFR BLD: 4.8 %
LH SERPL-ACNC: 11.9 MIU/ML
TSH SERPL DL<=0.005 MIU/L-ACNC: 3.13 UIU/ML (ref 0.27–4.2)

## 2025-02-21 LAB
COMMENT: NORMAL
HPV OTHER HR TYPES: NOT DETECTED
HPV TYPE 16: NOT DETECTED
HPV TYPE 18: NOT DETECTED
SHBG SERPL-SCNC: 20 NMOL/L (ref 25–122)
TESTOST FREE SERPL-MCNC: 4.9 PG/ML (ref 1.3–9.2)
TESTOST SERPL-MCNC: 21 NG/DL (ref 8–48)

## 2025-02-28 LAB — GYNECOLOGY CYTOLOGY REPORT: NORMAL

## 2025-03-08 LAB
Lab: ABNORMAL
Lab: ABNORMAL

## 2025-03-10 ENCOUNTER — RESULTS FOLLOW-UP (OUTPATIENT)
Dept: OBGYN | Age: 30
End: 2025-03-10

## 2025-03-23 DIAGNOSIS — N92.6 IRREGULAR MENSES: ICD-10-CM

## 2025-03-24 RX ORDER — ACETAMINOPHEN AND CODEINE PHOSPHATE 120; 12 MG/5ML; MG/5ML
1 SOLUTION ORAL DAILY
Qty: 28 TABLET | Refills: 0 | OUTPATIENT
Start: 2025-03-24

## 2025-03-27 DIAGNOSIS — N92.6 IRREGULAR MENSES: ICD-10-CM

## 2025-03-28 RX ORDER — ACETAMINOPHEN AND CODEINE PHOSPHATE 120; 12 MG/5ML; MG/5ML
1 SOLUTION ORAL DAILY
Qty: 84 TABLET | Refills: 3 | Status: SHIPPED | OUTPATIENT
Start: 2025-03-28

## 2025-05-14 RX ORDER — NORGESTIMATE AND ETHINYL ESTRADIOL 0.25-0.035
1 KIT ORAL DAILY
Qty: 3 PACKET | Refills: 3 | Status: SHIPPED | OUTPATIENT
Start: 2025-05-14 | End: 2026-05-14